# Patient Record
Sex: MALE
[De-identification: names, ages, dates, MRNs, and addresses within clinical notes are randomized per-mention and may not be internally consistent; named-entity substitution may affect disease eponyms.]

---

## 2017-12-30 NOTE — ED PDOC
HPI: Abdomen


Time Seen by Provider: 17 12:58


Chief Complaint (Nursing): Abdominal Pain


Chief Complaint (Provider): Abdominal pain


History Per: Patient, Family


History/Exam Limitations: no limitations


Onset/Duration Of Symptoms: Days (1)


Outside of [place] travel?: No


Additional Complaint(s): 





Pt reports sharp constant L lower abdominal pain X 1 day, took Tylenol without 

relief, associated with nausea, radiates to L testicle.  Denies fever, vomiting

, constipation, diarrhea, dysuria, hematuria, penile discharge.  Took 2 Tylenol 

without relief. 





Past Medical History


Reviewed: Nursing Documentation, Vital Signs


Vital Signs: 


 Last Vital Signs











Temp  97 F L  17 12:52


 


Pulse  76   17 12:52


 


Resp  20   17 12:52


 


BP  138/71   17 12:52


 


Pulse Ox  100   17 15:33














- Medical History


PMH: No Chronic Diseases





- Surgical History


Surgical History: No Surg Hx





- Family History


Family History: States: Unknown Family Hx





- Living Arrangements


Living Arrangements: With Family





- Social History


Current smoker - smoking cessation education provided: No


Alcohol: None





- Immunization History


Hx Influenza Vaccination: No


Hx Pneumococcal Vaccination: No





- Home Medications


Home Medications: 


 Ambulatory Orders











 Medication  Instructions  Recorded


 


Acetaminophen [Tylenol] 325 mg PO Q6 PRN #20 tab 04/26/15


 


Ibuprofen [Motrin] 600 mg PO TID PRN #20 tab 04/26/15


 


Prednisone 2 tab PO DAILY #10 tab 04/26/15


 


Docusate [Colace] 1 tab PO BID #20 cap 16


 


Hard Fat/Phenylephrine Hydro 1 sup RC DAILY #7 sup 16





[Anusol Suppository]  


 


Acetaminophen with Codeine 1 tab PO Q6H PRN #5 tab 17





[Tylenol with Codeine No. 3 300  





mg-30 mg]  


 


Ciprofloxacin [Cipro] 500 mg PO BID #10 tab 17


 


Naproxen [Naprosyn] 500 mg PO BID PRN #15 tablet 17


 


Tamsulosin [Flomax] 0.4 mg PO DAILY #14 cap 17














- Allergies


Allergies/Adverse Reactions: 


 Allergies











Allergy/AdvReac Type Severity Reaction Status Date / Time


 


No Known Allergies Allergy   Verified 16 12:16














Review of Systems


Constitutional: Negative for: Fever, Chills


Cardiovascular: Negative for: Chest Pain


Respiratory: Negative for: Cough, Shortness of Breath


Gastrointestinal: Positive for: Nausea, Abdominal Pain.  Negative for: Vomiting

, Diarrhea, Constipation, Melena, Hematochezia, Hematemesis, Rectal Pain


Genitourinary Male: Positive for: Scrotal Pain.  Negative for: Dysuria, 

Frequency, Incontinence, Hematuria, Penile Discharge, Rash, Penile Pain


Musculoskeletal: Negative for: Back Pain


Skin: Negative for: Rash, Lesions


Neurological: Negative for: Weakness, Numbness, Headache, Dizziness





Physical Exam





- Reviewed


Nursing Documentation Reviewed: Yes


Vital Signs Reviewed: Yes





- Physical Exam


Appears: Positive for: Uncomfortable


Skin: Positive for: Normal Color, Warm, Dry


Cardiovascular/Chest: Positive for: Regular Rate, Rhythm


Respiratory: Positive for: Normal Breath Sounds


Gastrointestinal/Abdominal: Positive for: Bowel Sounds, Soft, Tenderness (L 

suprapubic).  Negative for: Organomegaly, Mass, Distended, Guarding, Rebound, 

Hernia, Asicites


Male Genital Exam: Positive for: normal genitalia.  Negative for: testicular 

tenderness (R), testicular tenderness (L)


Back: Positive for: Normal Inspection.  Negative for: L CVA Tenderness, R CVA 

Tenderness


Extremity: Positive for: Normal ROM


Neurologic/Psych: Positive for: Alert, Oriented





- Laboratory Results


Result Diagrams: 


 17 13:30





 17 13:30





- ECG


O2 Sat by Pulse Oximetry: 100





Medical Decision Making


Medical Decision Makin yo male with L suprapubic tenderness radiating to L testicle.


- labs


- CT abd/pelvis


- testicular ultrasound


- IVF


- Toradol





--15:08


FINDINGS: Testicular Ultrasound





RIGHT TESTICLE:


Measures 4.7 x 2.9 x 1.8 cm. Homogeneous echotexture. No mass. Normal flow 

demonstrated





RIGHT EPIDIDYMIS:


Epididymal head measures 8 x 8 x 11 mm . Grossly unremarkable appearance with 

normal flow.





LEFT TESTICLE:


Measures 4.6 x 2.8 x 1.8 cm. Homogeneous echotexture. No mass. Normal flow 

demonstrated.





LEFT EPIDIDYMIS:


Epididymal head measures 8 x 9 x 10 mm. . Grossly unremarkable appearance with 

normal flow.





HYDROCELE:


None.





VARICOCELE:


None.





OTHER FINDINGS:


None. 





IMPRESSION:


No evidence of testicular torsion. Normal examination.





--14:19


FINDINGS:CT Abdomen and Pelvis w/o Intravenous Contrast





LOWER THORAX:


Unremarkable. 





LIVER:


Unremarkable. No gross lesion or ductal dilatation.  





GALLBLADDER AND BILE DUCTS:


Unremarkable. 





PANCREAS:


Unremarkable. No gross lesion or ductal dilatation.





SPLEEN:


Unremarkable. 





ADRENALS:


Unremarkable. No mass. 





KIDNEYS AND URETERS:


2 mm nonobstructing left lower pole renal calculus. No right renal calculus.  

No renal mass. No hydronephrosis. No hydroureter.  2 mm calculus at the left 

ureteral orifice, at bladder base. 





VASCULATURE:


Unremarkable. No aortic aneurysm. 





BOWEL:


Unremarkable. No obstruction. No gross mural thickening. 





APPENDIX:


Unremarkable. Normal appendix. 





PERITONEUM:


Unremarkable. No free fluid. No free air. 





LYMPH NODES:


Unremarkable. No enlarged lymph nodes. 





BLADDER:


Nondistended 





REPRODUCTIVE:


Normal prostate 





BONES:


No acute fracture. 





OTHER FINDINGS:


None.





IMPRESSION:


2 mm nonobstructing calculus at the left ureteral orifice.  2 mm nonobstructing 

left lower pole renal calculus. No additional abnormality.











 

















Disposition





- Clinical Impression


Clinical Impression: 


 Ureteral calculus








- Disposition


Referrals: 


Eugenio Leo MD [Staff Provider] - 


Disposition: Routine/Home


Disposition Time: 14:30


Condition: IMPROVED


Prescriptions: 


Acetaminophen with Codeine [Tylenol with Codeine No. 3 300 mg-30 mg] 1 tab PO 

Q6H PRN #5 tab


 PRN Reason: Pain, Severe (8-10)


Ciprofloxacin [Cipro] 500 mg PO BID #10 tab


Naproxen [Naprosyn] 500 mg PO BID PRN #15 tablet


 PRN Reason: Pain, Moderate (4-7)


Tamsulosin [Flomax] 0.4 mg PO DAILY #14 cap


Instructions:  Renal Colic (ED), Ureteral Stones (ED)


Forms:  [employer] (Togolese)


Print Language: Setswana

## 2017-12-30 NOTE — CT
PROCEDURE:  CT Abdomen and Pelvis without intravenous contrast



HISTORY:

L suprapubic pain, radiates to testicle



COMPARISON:

9/24/2016



TECHNIQUE:

Without contrast.. 



Contrast Dose: 0



Radiation dose:



Total exam DLP = 586.70 mGy-cm.



This CT exam was performed using one or more of the following dose 

reduction techniques: Automated exposure control, adjustment of the 

mA and/or kV according to patient size, and/or use of iterative 

reconstruction technique.



FINDINGS:



LOWER THORAX:

Unremarkable. 



LIVER:

Unremarkable. No gross lesion or ductal dilatation.  



GALLBLADDER AND BILE DUCTS:

Unremarkable. 



PANCREAS:

Unremarkable. No gross lesion or ductal dilatation.



SPLEEN:

Unremarkable. 



ADRENALS:

Unremarkable. No mass. 



KIDNEYS AND URETERS:

2 mm nonobstructing left lower pole renal calculus. No right renal 

calculus.  No renal mass. No hydronephrosis. No hydroureter.  2 mm 

calculus at the left ureteral orifice, at bladder base. 



VASCULATURE:

Unremarkable. No aortic aneurysm. 



BOWEL:

Unremarkable. No obstruction. No gross mural thickening. 



APPENDIX:

Unremarkable. Normal appendix. 



PERITONEUM:

Unremarkable. No free fluid. No free air. 



LYMPH NODES:

Unremarkable. No enlarged lymph nodes. 



BLADDER:

Nondistended 



REPRODUCTIVE:

Normal prostate 



BONES:

No acute fracture. 



OTHER FINDINGS:

None.



IMPRESSION:

2 mm nonobstructing calculus at the left ureteral orifice.  2 mm 

nonobstructing left lower pole renal calculus. No additional 

abnormality.

## 2017-12-30 NOTE — US
HISTORY:

L testicular pain



TECHNIQUE:

Realtime sonography through the scrotum with color and doppler flow.



COMPARISON:

None Available.



FINDINGS:



RIGHT TESTICLE:

Measures 4.7 x 2.9 x 1.8 cm. Homogeneous echotexture. No mass. Normal 

flow demonstrated



RIGHT EPIDIDYMIS:

Epididymal head measures 8 x 8 x 11 mm . Grossly unremarkable 

appearance with normal flow.



LEFT TESTICLE:

Measures 4.6 x 2.8 x 1.8 cm. Homogeneous echotexture. No mass. Normal 

flow demonstrated.



LEFT EPIDIDYMIS:

Epididymal head measures 8 x 9 x 10 mm. . Grossly unremarkable 

appearance with normal flow.



HYDROCELE:

None.



VARICOCELE:

None.



OTHER FINDINGS:

None. 



IMPRESSION:

No evidence of testicular torsion. Normal examination.

## 2018-02-16 ENCOUNTER — HOSPITAL ENCOUNTER (EMERGENCY)
Dept: HOSPITAL 14 - H.ER | Age: 33
Discharge: HOME | End: 2018-02-16
Payer: COMMERCIAL

## 2018-02-16 VITALS
HEART RATE: 63 BPM | OXYGEN SATURATION: 98 % | DIASTOLIC BLOOD PRESSURE: 79 MMHG | TEMPERATURE: 98.6 F | SYSTOLIC BLOOD PRESSURE: 125 MMHG | RESPIRATION RATE: 17 BRPM

## 2018-02-16 VITALS — BODY MASS INDEX: 25.3 KG/M2

## 2018-02-16 DIAGNOSIS — M25.561: ICD-10-CM

## 2018-02-16 DIAGNOSIS — M25.562: Primary | ICD-10-CM

## 2018-02-16 NOTE — ED PDOC
Lower Extremity Pain/Injury


Time Seen by Provider: 02/16/18 11:40


Chief Complaint (Nursing): Lower Extremity Problem/Injury


Chief Complaint (Provider): Bilateral knee pain 


History Per: Patient


History/Exam Limitations: no limitations


Onset/Duration Of Symptoms: Days


Current Symptoms Are (Timing): Still Present


Severity: Moderate


Pain Scale Rating Of: 6


Additional Complaint(s): 





Pain for 3 months. Pt has not taken anything for pain. PT reports pain worse in 

the morning. No trauma. 





Past Medical History


Reviewed: Historical Data, Nursing Documentation, Vital Signs


Vital Signs: 





 Last Vital Signs











Temp  98.6 F   02/16/18 11:04


 


Pulse  63   02/16/18 11:04


 


Resp  17   02/16/18 11:04


 


BP  125/79   02/16/18 11:04


 


Pulse Ox  98   02/16/18 11:12














- Medical History


PMH: No Chronic Diseases





- Surgical History


Surgical History: No Surg Hx





- Family History


Family History: States: Unknown Family Hx





- Living Arrangements


Living Arrangements: With Family





- Social History


Current smoker - smoking cessation education provided: No


Alcohol: Occasional


Drugs: Denies





- Immunization History


Hx Influenza Vaccination: No


Hx Pneumococcal Vaccination: No





- Home Medications


Home Medications: 


 Ambulatory Orders











 Medication  Instructions  Recorded


 


Acetaminophen [Tylenol] 325 mg PO Q6 PRN #20 tab 04/26/15


 


Ibuprofen [Motrin] 600 mg PO TID PRN #20 tab 04/26/15


 


Prednisone 2 tab PO DAILY #10 tab 04/26/15


 


Docusate [Colace] 1 tab PO BID #20 cap 03/29/16


 


Hard Fat/Phenylephrine Hydro 1 sup RC DAILY #7 sup 03/29/16





[Anusol Suppository]  


 


Acetaminophen with Codeine 1 tab PO Q6H PRN #5 tab 12/30/17





[Tylenol with Codeine No. 3 300  





mg-30 mg]  


 


Ciprofloxacin [Cipro] 500 mg PO BID #10 tab 12/30/17


 


Naproxen [Naprosyn] 500 mg PO BID PRN #15 tablet 12/30/17


 


Tamsulosin [Flomax] 0.4 mg PO DAILY #14 cap 12/30/17


 


Ibuprofen [Motrin Tab] 800 mg PO Q6H PRN #20 tab 02/16/18














- Allergies


Allergies/Adverse Reactions: 


 Allergies











Allergy/AdvReac Type Severity Reaction Status Date / Time


 


No Known Allergies Allergy   Verified 03/29/16 12:16














Review of Systems


ROS Statement: Except As Marked, All Systems Reviewed And Found Negative


Constitutional: Positive for: Other (Weight loss ).  Negative for: Fever, Chills


Respiratory: Negative for: Cough, Shortness of Breath


Gastrointestinal: Negative for: Vomiting, Abdominal Pain


Genitourinary Male: Negative for: Dysuria


Musculoskeletal: Positive for: Other





Physical Exam





- Reviewed


Nursing Documentation Reviewed: Yes


Vital Signs Reviewed: Yes





- Physical Exam


Appears: Positive for: Well, Non-toxic, No Acute Distress


Head Exam: Positive for: ATRAUMATIC, NORMAL INSPECTION, NORMOCEPHALIC


Skin: Positive for: Normal Color, Warm, DRY


Eye Exam: Positive for: Normal appearance


ENT: Positive for: Normal ENT Inspection


Neck: Positive for: Normal, Painless ROM


Respiratory: Negative for: Accessory Muscle Use, Respiratory Distress


Back: Positive for: Normal Inspection


Extremity: Positive for: Normal ROM.  Negative for: Tenderness, Deformity, 

Swelling


Neurologic/Psych: Positive for: Alert, Oriented





- ECG


O2 Sat by Pulse Oximetry: 98





Medical Decision Making


Medical Decision Making: 





Normal knee x-ray. 





Disposition





- Clinical Impression


Clinical Impression: 


 Bilateral knee pain








- Patient ED Disposition


Is Patient to be Admitted: No


Counseled Patient/Family Regarding: Diagnosis, Need For Followup, Rx Given





- Disposition


Referrals: 


Koffi Prater MD [Medical Doctor] - 


Disposition: Routine/Home


Disposition Time: 13:45


Condition: GOOD


Prescriptions: 


Ibuprofen [Motrin Tab] 800 mg PO Q6H PRN #20 tab


 PRN Reason: Pain


Instructions:  Chronic Knee Pain

## 2018-02-16 NOTE — RAD
PROCEDURE:  Bilateral Knee Radiographs.



HISTORY:

Bilateral knee pain, x 3 months



COMPARISON:

None.



FINDINGS:



BONES:

Right Knee:  No acute fracture. 



Left Knee:  No acute fracture. 



JOINTS:

Right Knee:  Unremarkable. 



Left knee: Unremarkable. 



SOFT TISSUES:

Right Knee: Normal.



Left Knee: Normal.



JOINT EFFUSION:

Right Knee: None. 



Left Knee: None.



OTHER FINDINGS:

None.



IMPRESSION:

No demonstrated fracture or dislocation.

## 2018-08-29 ENCOUNTER — HOSPITAL ENCOUNTER (EMERGENCY)
Dept: HOSPITAL 14 - H.ER | Age: 33
Discharge: HOME | End: 2018-08-29
Payer: COMMERCIAL

## 2018-08-29 VITALS — TEMPERATURE: 98.9 F | SYSTOLIC BLOOD PRESSURE: 110 MMHG | HEART RATE: 65 BPM | DIASTOLIC BLOOD PRESSURE: 69 MMHG

## 2018-08-29 VITALS — OXYGEN SATURATION: 98 % | RESPIRATION RATE: 18 BRPM

## 2018-08-29 VITALS — BODY MASS INDEX: 25.3 KG/M2

## 2018-08-29 DIAGNOSIS — R19.7: Primary | ICD-10-CM

## 2018-08-29 DIAGNOSIS — R10.9: ICD-10-CM

## 2018-08-29 LAB
ALBUMIN SERPL-MCNC: 3.9 G/DL (ref 3.5–5)
ALBUMIN/GLOB SERPL: 1.3 {RATIO} (ref 1–2.1)
ALT SERPL-CCNC: 77 U/L (ref 21–72)
AST SERPL-CCNC: 31 U/L (ref 17–59)
BASOPHILS # BLD AUTO: 0 K/UL (ref 0–0.2)
BASOPHILS NFR BLD: 0.1 % (ref 0–2)
BUN SERPL-MCNC: 12 MG/DL (ref 9–20)
CALCIUM SERPL-MCNC: 9 MG/DL (ref 8.4–10.2)
EOSINOPHIL # BLD AUTO: 0.2 K/UL (ref 0–0.7)
EOSINOPHIL NFR BLD: 2.4 % (ref 0–4)
ERYTHROCYTE [DISTWIDTH] IN BLOOD BY AUTOMATED COUNT: 14.1 % (ref 11.5–14.5)
GFR NON-AFRICAN AMERICAN: > 60
HGB BLD-MCNC: 14.4 G/DL (ref 12–18)
LIPASE SERPL-CCNC: 26 U/L (ref 23–300)
LYMPHOCYTES # BLD AUTO: 1.2 K/UL (ref 1–4.3)
LYMPHOCYTES NFR BLD AUTO: 13.5 % (ref 20–40)
MCH RBC QN AUTO: 31.1 PG (ref 27–31)
MCHC RBC AUTO-ENTMCNC: 34.1 G/DL (ref 33–37)
MCV RBC AUTO: 91.1 FL (ref 80–94)
MONOCYTES # BLD: 1.5 K/UL (ref 0–0.8)
MONOCYTES NFR BLD: 17.4 % (ref 0–10)
NEUTROPHILS # BLD: 5.8 K/UL (ref 1.8–7)
NEUTROPHILS NFR BLD AUTO: 66.6 % (ref 50–75)
NRBC BLD AUTO-RTO: 0 % (ref 0–0)
PLATELET # BLD: 270 K/UL (ref 130–400)
PMV BLD AUTO: 9.7 FL (ref 7.2–11.7)
RBC # BLD AUTO: 4.62 MIL/UL (ref 4.4–5.9)
WBC # BLD AUTO: 8.6 K/UL (ref 4.8–10.8)

## 2018-08-29 PROCEDURE — 87040 BLOOD CULTURE FOR BACTERIA: CPT

## 2018-08-29 PROCEDURE — 80053 COMPREHEN METABOLIC PANEL: CPT

## 2018-08-29 PROCEDURE — 99285 EMERGENCY DEPT VISIT HI MDM: CPT

## 2018-08-29 PROCEDURE — 96374 THER/PROPH/DIAG INJ IV PUSH: CPT

## 2018-08-29 PROCEDURE — 83690 ASSAY OF LIPASE: CPT

## 2018-08-29 PROCEDURE — 83735 ASSAY OF MAGNESIUM: CPT

## 2018-08-29 PROCEDURE — 85025 COMPLETE CBC W/AUTO DIFF WBC: CPT

## 2018-08-29 PROCEDURE — 83605 ASSAY OF LACTIC ACID: CPT

## 2018-08-29 PROCEDURE — 84100 ASSAY OF PHOSPHORUS: CPT

## 2018-08-29 NOTE — ED PDOC
HPI: Abdomen


Time Seen by Provider: 08/29/18 16:33


Chief Complaint (Nursing): Abdominal Pain


Chief Complaint (Provider): Abdominal pain


History Per: Patient


History/Exam Limitations: no limitations


Onset/Duration Of Symptoms: Hrs


Outside of [place] travel?: No


Current Symptoms Are (Timing): Still Present


Additional History Per: Patient


Additional Complaint(s): 


31yo male, otherwise well, comes to ER for evaluation of abdominal pain, 

vomiting and diarrhea for 3 days. Patietn states he has had multiple episodes 

of watery and non-bloody diarrhea, with a diffuse and crampy abdominal pain. He 

reports an episode of vomimting with small amount of blood. Since then, patient 

has not eaten anything as it exacerbates his nausea and abdominal pain. He has 

been able to tolerate fluid intake and has been drinking water and gatorade. 

Patient reports subjective fever and chills yesterday but none today. He denies 

any recent travels but states his child has had diarrhea x 4 days. Otherwise, 

denies any other medical complaints.





PMD: None





Past Medical History


Reviewed: Historical Data, Nursing Documentation, Vital Signs


Vital Signs: 


 Last Vital Signs











Temp  98.8 F   08/29/18 16:20


 


Pulse  87   08/29/18 16:20


 


Resp  18   08/29/18 16:20


 


BP  132/78   08/29/18 16:20


 


Pulse Ox  98   08/29/18 20:07














- Medical History


PMH: No Chronic Diseases


   Denies: Chronic Kidney Disease





- Surgical History


Surgical History: No Surg Hx





- Family History


Family History: States: No Known Family Hx





- Living Arrangements


Living Arrangements: With Family





- Social History


Current smoker - smoking cessation education provided: No


Alcohol: None


Drugs: Denies





- Immunization History


Hx Influenza Vaccination: No


Hx Pneumococcal Vaccination: No





- Home Medications


Home Medications: 


 Ambulatory Orders











 Medication  Instructions  Recorded


 


Acetaminophen [Tylenol] 325 mg PO Q6 PRN #20 tab 04/26/15


 


Ibuprofen [Motrin] 600 mg PO TID PRN #20 tab 04/26/15


 


Prednisone 2 tab PO DAILY #10 tab 04/26/15


 


Docusate [Colace] 1 tab PO BID #20 cap 03/29/16


 


Hard Fat/Phenylephrine Hydro 1 sup RC DAILY #7 sup 03/29/16





[Anusol Suppository]  


 


Acetaminophen with Codeine 1 tab PO Q6H PRN #5 tab 12/30/17





[Tylenol with Codeine No. 3 300  





mg-30 mg]  


 


Ciprofloxacin [Cipro] 500 mg PO BID #10 tab 12/30/17


 


Naproxen [Naprosyn] 500 mg PO BID PRN #15 tablet 12/30/17


 


Tamsulosin [Flomax] 0.4 mg PO DAILY #14 cap 12/30/17


 


Ibuprofen [Motrin Tab] 800 mg PO Q6H PRN #20 tab 02/16/18


 


Dicyclomine [Bentyl] 20 mg PO QID PRN #20 tab 08/29/18


 


Ondansetron ODT [Zofran ODT] 1 odt PO Q6 PRN #20 odt 08/29/18














- Allergies


Allergies/Adverse Reactions: 


 Allergies











Allergy/AdvReac Type Severity Reaction Status Date / Time


 


No Known Allergies Allergy   Verified 08/29/18 16:21














Review of Systems


ROS Statement: Except As Marked, All Systems Reviewed And Found Negative (per 

HPI)


Constitutional: Negative for: Fever, Chills


Gastrointestinal: Positive for: Nausea, Vomiting, Abdominal Pain





Physical Exam





- Reviewed


Nursing Documentation Reviewed: Yes


Vital Signs Reviewed: Yes





- Physical Exam


Appears: Positive for: Non-toxic, No Acute Distress


Head Exam: Positive for: ATRAUMATIC, NORMAL INSPECTION, NORMOCEPHALIC


Skin: Positive for: Normal Color


Eye Exam: Positive for: Normal appearance


ENT: Positive for: Other (dry mucus membranes)


Neck: Positive for: Normal, Supple


Cardiovascular/Chest: Positive for: Regular Rate, Rhythm.  Negative for: Murmur


Respiratory: Positive for: Normal Breath Sounds.  Negative for: Respiratory 

Distress


Gastrointestinal/Abdominal: Positive for: Soft, Tenderness (diffuse tenderness, 

none in rigt lower quadrant).  Negative for: Mass, Guarding, Rebound


Back: Positive for: Normal Inspection.  Negative for: Decreased ROM


Extremity: Positive for: Normal ROM.  Negative for: Deformity


Lymphatic: Negative for: Adenopathy


Neurologic/Psych: Positive for: Alert.  Negative for: Motor/Sensory Deficits





- Laboratory Results


Result Diagrams: 


 08/29/18 18:25





 08/29/18 18:25





- ECG


O2 Sat by Pulse Oximetry: 98 (RA)


Pulse Ox Interpretation: Normal





Medical Decision Making


Medical Decision Making: 


Impression: Abdominal pain with diarrhea


Differential: Gastroenteritis, dehydration, viral illness, colitis, electrolyte 

abnormalities


Plan:


-- Labs


-- Stool culture


-- Ova and parasite culture


-- Rotavirus antigen


-- Tylenol 975mg PO


-- IV Fluids


-- Zofran 8mg IV


-- Bentyl 20mg PO











Scribe Attestation:


Documented by Lexy Rangel, acting as a scribe for Elena Montero MD.





Provider Scribe Attestation:


All medical record entries made by the Scribe were at my direction and 

personally dictated by me. I have reviewed the chart and agree that the record 

accurately reflects my personal performance of the history, physical exam, 

medical decision making, and the department course for this patient. I have 

also personally directed, reviewed, and agree with the discharge instructions 

and disposition.








Disposition





- Clinical Impression


Clinical Impression: 


 Diarrhea, Abdominal pain








- Disposition


Referrals: 


[facility] [Outside]


Disposition: Routine/Home


Disposition Time: 19:00


Condition: IMPROVED


Additional Instructions: 


FOLLOW UP WITH [employer] OR YOUR DOCTOR IN 24-48 HOURS FOR REEVALUATION


Prescriptions: 


Dicyclomine [Bentyl] 20 mg PO QID PRN #20 tab


 PRN Reason: abdominal pain


Ondansetron ODT [Zofran ODT] 1 odt PO Q6 PRN #20 odt


 PRN Reason: Nausea/Vomiting


Instructions:  Diarrhea in Adolescents and Adults, Acute Abdomen (Belly Pain), 

Adult (DC)


Forms:  [employer] ([place])


Print Language: Eritrean